# Patient Record
Sex: FEMALE
[De-identification: names, ages, dates, MRNs, and addresses within clinical notes are randomized per-mention and may not be internally consistent; named-entity substitution may affect disease eponyms.]

---

## 2020-09-16 ENCOUNTER — DASHBOARD ENCOUNTERS (OUTPATIENT)
Age: 45
End: 2020-09-16

## 2020-09-16 ENCOUNTER — OFFICE VISIT (OUTPATIENT)
Dept: URBAN - METROPOLITAN AREA CLINIC 98 | Facility: CLINIC | Age: 45
End: 2020-09-16
Payer: COMMERCIAL

## 2020-09-16 VITALS
HEIGHT: 64 IN | SYSTOLIC BLOOD PRESSURE: 116 MMHG | DIASTOLIC BLOOD PRESSURE: 73 MMHG | TEMPERATURE: 97.2 F | HEART RATE: 78 BPM | WEIGHT: 238 LBS | BODY MASS INDEX: 40.63 KG/M2

## 2020-09-16 DIAGNOSIS — Z12.11 COLON CANCER SCREENING: ICD-10-CM

## 2020-09-16 DIAGNOSIS — K59.09 CHRONIC CONSTIPATION: ICD-10-CM

## 2020-09-16 DIAGNOSIS — E66.9 OBESITY: ICD-10-CM

## 2020-09-16 PROBLEM — 275978004 COLON CANCER SCREENING: Status: ACTIVE | Noted: 2020-09-16

## 2020-09-16 PROCEDURE — G8427 DOCREV CUR MEDS BY ELIG CLIN: HCPCS | Performed by: INTERNAL MEDICINE

## 2020-09-16 PROCEDURE — G9903 PT SCRN TBCO ID AS NON USER: HCPCS | Performed by: INTERNAL MEDICINE

## 2020-09-16 PROCEDURE — 99204 OFFICE O/P NEW MOD 45 MIN: CPT | Performed by: INTERNAL MEDICINE

## 2020-09-16 PROCEDURE — G8417 CALC BMI ABV UP PARAM F/U: HCPCS | Performed by: INTERNAL MEDICINE

## 2020-09-16 RX ORDER — SODIUM, POTASSIUM,MAG SULFATES 17.5-3.13G
354ML SOLUTION, RECONSTITUTED, ORAL ORAL
Qty: 354 MILLILITER | Refills: 0 | OUTPATIENT
Start: 2020-09-16 | End: 2020-09-17

## 2020-09-16 RX ORDER — MELOXICAM 15 MG/1
1 TABLET TABLET ORAL ONCE A DAY
Status: ACTIVE | COMMUNITY

## 2020-09-16 NOTE — HPI-TODAY'S VISIT:
Ms. Garcia is a 45 to F presenting for CRC screening. She has had chronic constipation her entire life. She has one BM per week. She will have a large, soft BM at the end of a week. Her stool is not particularly hard. She has no blood in the stool. She has daily BMs around her menses. She is not eating many vegetables, drinking water or exercising at this time. She does have cramping and bloating associated with it but not much pain. She has gained weight recently. She is meeting with a nutritionist soon for weight loss. No family history of colorectal cancer or colon disease.  She has mild anemia that has been attributed to heavy menses.   9/3/2020 Labs WBC count 9 Hgb 10.10 Hct 33.4 Plts 473 MCV 85 TSH 1.94 Na 140 K 4.5 Cl106 CO2 26  BUN 10 Cr 0.8 AST 12 ALT 9 Alk P 76 alb 3.8 t bili 0.58

## 2020-09-16 NOTE — EXAM-PHYSICAL EXAM
Constitutional: well-developed, normal communication ability.   Eyes: Conjunctivae and eyelids appear normal, no scleral icterus.   Nose/nasopharynx, external nose: appear normal, normal appearance of lips.   Neck/thyroid: normal appearance, no masses felt.   Chest: No lesions or deformities. Respiratory: lungs clear to auscultation bilaterally, no rales or wheezing, no crackles   Cardiovascular: No edema, no murmurs or gallops appreciated.   Gastrointestinal: Obese, well-healed scars, normoactive bowel sounds, soft, no tenderness, no rebound tenderness, no shifting dullness, no organomegaly.   Lymphatic: Neck without lymphadenopathy.   Musculoskeletal: normal gait and station, no tenderness present.   Skin: no rashes or jaundice. No tenderness on palpation.    Neurologic: Oriented to person, place, time. Normal sensation, short term memory intact.    Psychiatric: Normal mood and appropriate affect.

## 2020-10-02 ENCOUNTER — TELEPHONE ENCOUNTER (OUTPATIENT)
Dept: URBAN - METROPOLITAN AREA CLINIC 98 | Facility: CLINIC | Age: 45
End: 2020-10-02

## 2020-10-20 ENCOUNTER — OFFICE VISIT (OUTPATIENT)
Dept: URBAN - METROPOLITAN AREA SURGERY CENTER 18 | Facility: SURGERY CENTER | Age: 45
End: 2020-10-20

## 2020-12-22 ENCOUNTER — OFFICE VISIT (OUTPATIENT)
Dept: URBAN - METROPOLITAN AREA SURGERY CENTER 18 | Facility: SURGERY CENTER | Age: 45
End: 2020-12-22

## 2025-04-14 ENCOUNTER — LAB OUTSIDE AN ENCOUNTER (OUTPATIENT)
Dept: URBAN - METROPOLITAN AREA CLINIC 98 | Facility: CLINIC | Age: 50
End: 2025-04-14

## 2025-04-14 ENCOUNTER — OFFICE VISIT (OUTPATIENT)
Dept: URBAN - METROPOLITAN AREA CLINIC 98 | Facility: CLINIC | Age: 50
End: 2025-04-14
Payer: COMMERCIAL

## 2025-04-14 DIAGNOSIS — K59.04 CHRONIC IDIOPATHIC CONSTIPATION: ICD-10-CM

## 2025-04-14 DIAGNOSIS — D50.0 IRON DEFICIENCY ANEMIA DUE TO CHRONIC BLOOD LOSS: ICD-10-CM

## 2025-04-14 DIAGNOSIS — Z12.11 COLON CANCER SCREENING: ICD-10-CM

## 2025-04-14 PROBLEM — 82934008: Status: ACTIVE | Noted: 2025-04-14

## 2025-04-14 PROBLEM — 724556004: Status: ACTIVE | Noted: 2025-04-14

## 2025-04-14 PROCEDURE — 99204 OFFICE O/P NEW MOD 45 MIN: CPT | Performed by: INTERNAL MEDICINE

## 2025-04-14 RX ORDER — MELOXICAM 15 MG/1
1 TABLET TABLET ORAL ONCE A DAY
Status: ON HOLD | COMMUNITY

## 2025-04-14 NOTE — HPI-TODAY'S VISIT:
Ms. Garcia is a 49 to F presenting for CRC screening.  Last visit on 9/16/2020.   H/o anemia- s/p hysterectomy in 2023 and this improved her anemia.  Chronic constipation is still present. Having to use senna to have 1 BM per week.  No blood in stools.  On Ozempic and losing weight.    I reviewed:  9/3/2020 Labs WBC count 9 Hgb 10.10 Hct 33.4 Plts 473 MCV 85 TSH 1.94 Na 140 K 4.5 Cl106 CO2 26  BUN 10 Cr 0.8 AST 12 ALT 9 Alk P 76 alb 3.8 t bili 0.58

## 2025-04-22 ENCOUNTER — TELEPHONE ENCOUNTER (OUTPATIENT)
Dept: URBAN - METROPOLITAN AREA CLINIC 96 | Facility: CLINIC | Age: 50
End: 2025-04-22

## 2025-06-16 ENCOUNTER — OFFICE VISIT (OUTPATIENT)
Dept: URBAN - METROPOLITAN AREA SURGERY CENTER 18 | Facility: SURGERY CENTER | Age: 50
End: 2025-06-16
Payer: COMMERCIAL

## 2025-06-16 DIAGNOSIS — Z12.11 ENCOUNTER FOR SCREENING FOR MALIGNANT NEOPLASM OF COLON: ICD-10-CM

## 2025-06-16 PROCEDURE — G0121 COLON CA SCRN NOT HI RSK IND: HCPCS | Performed by: INTERNAL MEDICINE

## 2025-06-16 PROCEDURE — 0528F RCMND FLW-UP 10 YRS DOCD: CPT | Performed by: INTERNAL MEDICINE

## 2025-06-16 RX ORDER — MELOXICAM 15 MG/1
1 TABLET TABLET ORAL ONCE A DAY
Status: ON HOLD | COMMUNITY

## 2025-06-24 ENCOUNTER — WEB ENCOUNTER (OUTPATIENT)
Dept: URBAN - METROPOLITAN AREA CLINIC 98 | Facility: CLINIC | Age: 50
End: 2025-06-24

## 2025-06-30 ENCOUNTER — OFFICE VISIT (OUTPATIENT)
Dept: URBAN - METROPOLITAN AREA CLINIC 98 | Facility: CLINIC | Age: 50
End: 2025-06-30